# Patient Record
Sex: FEMALE | Race: WHITE | NOT HISPANIC OR LATINO | URBAN - METROPOLITAN AREA
[De-identification: names, ages, dates, MRNs, and addresses within clinical notes are randomized per-mention and may not be internally consistent; named-entity substitution may affect disease eponyms.]

---

## 2023-04-24 ENCOUNTER — EMERGENCY (EMERGENCY)
Facility: HOSPITAL | Age: 78
LOS: 1 days | Discharge: HOME | End: 2023-04-24
Attending: EMERGENCY MEDICINE | Admitting: EMERGENCY MEDICINE
Payer: COMMERCIAL

## 2023-04-24 VITALS
SYSTOLIC BLOOD PRESSURE: 155 MMHG | DIASTOLIC BLOOD PRESSURE: 71 MMHG | RESPIRATION RATE: 18 BRPM | HEART RATE: 60 BPM | OXYGEN SATURATION: 100 %

## 2023-04-24 VITALS
DIASTOLIC BLOOD PRESSURE: 72 MMHG | TEMPERATURE: 98 F | SYSTOLIC BLOOD PRESSURE: 191 MMHG | HEART RATE: 72 BPM | OXYGEN SATURATION: 99 % | WEIGHT: 158.07 LBS | RESPIRATION RATE: 18 BRPM | HEIGHT: 59 IN

## 2023-04-24 DIAGNOSIS — Z88.0 ALLERGY STATUS TO PENICILLIN: ICD-10-CM
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
## 2023-04-24 DIAGNOSIS — E78.5 HYPERLIPIDEMIA, UNSPECIFIED: ICD-10-CM

## 2023-04-24 DIAGNOSIS — S01.511A LACERATION WITHOUT FOREIGN BODY OF LIP, INITIAL ENCOUNTER: ICD-10-CM

## 2023-04-24 DIAGNOSIS — E03.9 HYPOTHYROIDISM, UNSPECIFIED: ICD-10-CM

## 2023-04-24 PROCEDURE — 99282 EMERGENCY DEPT VISIT SF MDM: CPT

## 2023-04-24 PROCEDURE — 99284 EMERGENCY DEPT VISIT MOD MDM: CPT

## 2023-04-24 NOTE — ED PROVIDER NOTE - PATIENT PORTAL LINK FT
You can access the FollowMyHealth Patient Portal offered by [facility] by registering at the following website: [email or web address]. By joining [facility]’s FollowMyHealth portal, you will also be able to view your health information using other applications (apps) compatible with our system.

## 2023-04-24 NOTE — ED PROVIDER NOTE - PHYSICAL EXAMINATION
CONST: nontoxic NAD speaking in full sentences  HEAD: atraumatic  EYES: conjunctivae clear, PERRL, EOMI, no racoon eyes  ENT: <1cm linear upper lip superficial mucosal laceration, +minimal ttp tooth#9 w/o loosening/chip/fx, other teeth intact, mmm, no lopez sign, tmj intact, tongue intact  NECK: supple/FROM, no midline ttp/stepoff/deformity  CARD: rrr no murmurs, no chest wall ttp/crepitus/deformity  CHEST: ctab no r/r/w  ABD: soft, nd, nttp, no rebound/guarding  PELVIS: stable, hips nttp  BACK: no midline ttp/stepoff/deformity  EXT: FROM, symmetric distal pulses intact  SKIN: warm, dry, no rash, cap refill <2sec  NEURO: a+ox3, 5/5 strength x4, gross sensation intact x4, baseline gait

## 2023-04-24 NOTE — ED PROVIDER NOTE - CLINICAL SUMMARY MEDICAL DECISION MAKING FREE TEXT BOX
hx obtained from pt and . avss. nontoxic. NAD. no indication for primary closure of mucosal lip laceration. no sig dental injury requiring emergent evaluation. dietary reccs and wond care provided. mechanical fall. no acute focal neuro deficits. no red flags. after lengthy d/w pt/, do NOT want ct head imaging. understand risk of missed/worsening disease, including poss death. questions answered. strict return precautions. will dc home w/ outpatient pcp fu.

## 2023-04-24 NOTE — ED ADULT NURSE NOTE - NS PRO PASSIVE SMOKE EXP
No Coumadin/Warfarin - Potential for adverse drug reactions and interactions/Coumadin/Warfarin - Compliance.../Coumadin/Warfarin - Dietary Advice.../Coumadin/Warfarin - Follow-up monitoring...

## 2023-04-24 NOTE — ED PROVIDER NOTE - PROGRESS NOTE DETAILS
after lengthy d/w pt/, do NOT want ct head imaging. understand risk of missed/worsening disease, including poss death. questions answered. strict return precautions.

## 2023-04-24 NOTE — ED PROVIDER NOTE - NSFOLLOWUPINSTRUCTIONS_ED_ALL_ED_FT
YOU HAVE DECLINED CT HEAD IMAGING AT THIS TIME. PLEASE MONITOR OVER THE NEXT 24-48HR FOR ANY SEVERE HEADACHE UNRELIEVED WITH TYLENOL, SOMNOLENCE, UNSTEADY GAIT, VOMITING, CHANGE IN BEHAVIOR, BACK/HIP/PELVIC PAIN, CHEST PAIN, SHORTNESS OF BREATH, OTHER CONCERNING SYMPTOMS.    RECOMMEND SOFT BLAND FOODS (EG, OATMEAL, BREAD, PUDDING, ETC) UNTIL YOUR MUCOSAL LIP LACERATION IS HEALED. AVOID ROUGH OR SPICY FOODS. RINSE MOUTH THOROUGHLY WITH WATER AFTER FOOD INGESTION.    RECOMMEND SOFT BLAND FOODS ALSO IF PERSISTENT TOOTH PAIN. RECOMMEND FOLLOW-UP WITH DENTIST IN 1-2 DAYS.    PLEASE CONTACT RACHELE ADRIAN ([facility] EMERGENCY DEPARTMENT CLINICAL REFERRAL COORDINATOR) TO ASSIST IN SCHEDULING YOUR FOLLOW-UP APPOINTMENT.    Monday - Friday 11am-7pm  [phone]  [email or web address]

## 2023-04-24 NOTE — ED ADULT NURSE NOTE - OBJECTIVE STATEMENT
.  77years female alert mental state (AOX3) received on foot.  pt is ambulatory herself with cane     -complain of fall.  pt fell down and tripped on the street. pt presents facial pain and lip laceration.  -denied chest pain, SOB, n/v/d, abdomen pain, fever.  Pt is in the bed comfortably at this time. Will continue to monitor and document any changes.

## 2023-04-24 NOTE — ED PROVIDER NOTE - OBJECTIVE STATEMENT
77F hld, hypothyroidism, c/o acute upper mucosal lip laceration s/p witnessed glmf on construction board on sidewalk <2-3h pta. pt had just completed routine checkup at pcp office, fell outside, went back inside and was referred to ED for poss closure of lip laceration. no loc, no n/v, no prodrome, no etoh, no antiplatelet/AC. was able to get self up and ambulate at baseline. no ha/dizziness, no loose/avulsed dentition, no jaw pain, no neck pain, no cp/sob, no abd pain, no back/hip/pelvic pain, no unsteady gait. 77F hld, hypothyroidism, c/o acute upper mucosal lip laceration s/p witnessed glmf on construction board on sidewalk <2-3h pta. pt had just completed routine checkup at pcp office, fell outside, went back inside and was referred to ED for poss closure of lip laceration. no loc, no n/v, no prodrome, no etoh, no antiplatelet/AC. was able to get self up and ambulate at baseline. no ha/dizziness, no loose/avulsed dentition, no jaw pain, no neck pain, no cp/sob, no abd pain, no back/hip/pelvic pain, no unsteady gait.    highly functional at baseline, a+ox3, +ADLs, ambulates unassisted, lives at home w/ .